# Patient Record
(demographics unavailable — no encounter records)

---

## 2025-02-06 NOTE — HISTORY OF PRESENT ILLNESS
[FreeTextEntry1] : Dear Clyde,   I had the pleasure of seeing your patient ALYSSIA BROWN for Cardiometabolic evaluation.   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 =============== ===============  Palpitations - TTe - Stress EKG - Hydration  - Exercise  - Nutrition -------------Very orthostatic - Sleeping 90/60 standing 80/55 - Sleep Apnea  HL - assess next time     ----------------------------  CP recurring  - Strress EKG, TTE  ----------------- ----------------- 2-2025 CC: Heart Issues - Notes No CP/SOB/Palps/Leg swelling - Reports No F/C/N/V/Headaches - Reports Normal Exercise Tolerance - Reports no medication changes - Reports normal mood/quality of life - Reports no diet changes - Reports no body aches - Reports no recent colds/viruses - Recent labs/imaging reviewed - Relevant Family history reviewed Mother/Father - CV Risk Assessment for 10 Year ACC/AHA Pooled Risk Cohort Equation places this person at < 7.5% Risk of ASCVD TTE -- 2-2025 - LVEF  Nl HAs Legit Long Covid

## 2025-02-06 NOTE — DISCUSSION/SUMMARY
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 =============== =============== FDyspnea - Appears long COBID - Exercise - Gluids  - Routine   Palpitations - TTe - Stress EKG - Hydration  - Exercise  - Nutrition -------------Very orthostatic - Sleeping 90/60 standing 80/55 - Sleep Apnea  HL - assess next time  Cholesterol management - Assessed - Impression is active; changes as per above - Discussed diet and exercise at length - Any lifestyle/medication changes as per above   Risk factors for cardiomyopathy - Assessed - Impression is stable - Reviewed records from PCP   BP - Assessed - Impression is active   Cardiac Health optimization - Discussed diet and exercise at length - Discussed importance of monitoring and re-assessment of cardiac health on further visits          Clyde, it was a pleasure to participate in the care of your patient.   With kind thanks for the referral.   Isra Samano MD [facility] DAILY AUGUSTINE Director, Preventive Cardiology & Lipidology [facility]                                                                                                                                                                                                                                                                                                                                                                                                                                                                                                                                                                                                                                                                                                                            23 minutes was provided for preventive counseling on healthy diet, weight maintenance, CV risk reduction. Specifically, and separate from other cardiovascular evaluation and treatment, we discussed the patient's SHANTIA  willingness to focus  on lifestyle changes, particularly dietary; including greater consumption of vegetables, fruits over saturated fats. We discussed appropriate follow up to monitor progress on these areas. We also discussed the importance of weight control to reduce any exacerbation of underlying conditions.

## 2025-05-15 NOTE — HISTORY OF PRESENT ILLNESS
[FreeTextEntry1] : Dear Clyde,   I had the pleasure of seeing your patient ALYSSIA BROWN for Cardiometabolic evaluation.   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 =============== ===============  Palpitations - TTe - Stress EKG - Hydration  - Exercise  - Nutrition -------------Very orthostatic - Sleeping 90/60 standing 80/55 - Sleep Apnea  HL - assess next time     ----------------------------  CP recurring  - Strress EKG, TTE  ----------------- ----------------- 5-2025 CC: Heart Issues; CP - Notes CP radiates down arm;  - Reports No F/C/N/V/Headaches - Reports Normal Exercise Tolerance - Reports no medication changes - Reports normal mood/quality of life - Reports no diet changes - Reports no body aches - Reports no recent colds/viruses - Recent labs/imaging reviewed - Relevant Family history reviewed Mother/Father - CV Risk Assessment for 10 Year ACC/AHA Pooled Risk Cohort Equation places this person at < 7.5% Risk of ASCVD TTE -- 2-2025 - LVEF  Nl HAs Legit Long Covid   CP - stress EKG

## 2025-05-15 NOTE — DISCUSSION/SUMMARY
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 =============== =============== FDyspnea - Appears long COBID - Exercise - Gluids  - Routine  CP radiates down arm - TTe - Stress EKG - Hydration  - Exercise  - Nutrition -------------Very orthostatic - Sleeping 90/60 standing 80/55 - Sleep Apnea  HL - assess next time  Cholesterol management - Assessed - Impression is active; changes as per above - Discussed diet and exercise at length - Any lifestyle/medication changes as per above   Risk factors for cardiomyopathy - Assessed - Impression is stable - Reviewed records from PCP   BP - Assessed - Impression is active   Cardiac Health optimization - Discussed diet and exercise at length - Discussed importance of monitoring and re-assessment of cardiac health on further visits          Clyde, it was a pleasure to participate in the care of your patient.   With kind thanks for the referral.   Isra Samano MD [facility] DAILY AUGUSTINE Director, Preventive Cardiology & Lipidology [facility]                                                                                                                                                                                                                                                                                                                                                                                                                                                                                                                                                                                                                                                                                                    [EKG obtained to assist in diagnosis and management of assessed problem(s)] : EKG obtained to assist in diagnosis and management of assessed problem(s)